# Patient Record
Sex: MALE
[De-identification: names, ages, dates, MRNs, and addresses within clinical notes are randomized per-mention and may not be internally consistent; named-entity substitution may affect disease eponyms.]

---

## 2019-04-23 PROBLEM — Z00.00 ENCOUNTER FOR PREVENTIVE HEALTH EXAMINATION: Status: ACTIVE | Noted: 2019-04-23

## 2019-04-24 ENCOUNTER — APPOINTMENT (OUTPATIENT)
Dept: OTOLARYNGOLOGY | Facility: CLINIC | Age: 64
End: 2019-04-24
Payer: MEDICARE

## 2019-04-24 DIAGNOSIS — H90.3 SENSORINEURAL HEARING LOSS, BILATERAL: ICD-10-CM

## 2019-04-24 DIAGNOSIS — B20 HUMAN IMMUNODEFICIENCY VIRUS [HIV] DISEASE: ICD-10-CM

## 2019-04-24 DIAGNOSIS — J31.0 CHRONIC RHINITIS: ICD-10-CM

## 2019-04-24 DIAGNOSIS — L91.8 OTHER HYPERTROPHIC DISORDERS OF THE SKIN: ICD-10-CM

## 2019-04-24 DIAGNOSIS — H93.299 OTHER ABNORMAL AUDITORY PERCEPTIONS, UNSPECIFIED EAR: ICD-10-CM

## 2019-04-24 DIAGNOSIS — M26.609 UNSPECIFIED TEMPOROMANDIBULAR JOINT DISORDER: ICD-10-CM

## 2019-04-24 PROCEDURE — 92550 TYMPANOMETRY & REFLEX THRESH: CPT

## 2019-04-24 PROCEDURE — 99213 OFFICE O/P EST LOW 20 MIN: CPT | Mod: 25

## 2019-04-24 PROCEDURE — 31231 NASAL ENDOSCOPY DX: CPT

## 2019-04-24 PROCEDURE — 99203 OFFICE O/P NEW LOW 30 MIN: CPT | Mod: 25

## 2019-04-24 PROCEDURE — 92557 COMPREHENSIVE HEARING TEST: CPT

## 2019-04-24 NOTE — CONSULT LETTER
[Dear  ___] : Dear  [unfilled], [Please see my note below.] : Please see my note below. [Courtesy Letter:] : I had the pleasure of seeing your patient, [unfilled], in my office today. [Sincerely,] : Sincerely, [Consult Closing:] : Thank you very much for allowing me to participate in the care of this patient.  If you have any questions, please do not hesitate to contact me. [FreeTextEntry2] : Dr. Nghia Guo\par  [FreeTextEntry3] : Kelechi \par \par \par Rodney Ulloa MD\par [facility]\par [facility]\par  [facility]\par \par

## 2019-04-24 NOTE — ASSESSMENT
CHIEF COMPLAINT: Vit D Deficiency  60 year old being seen as a f/u. On weekly ergo. No fractures. No kidney stones. Overall feeling well.       PAST MEDICAL HISTORY/PAST SURGICAL HISTORY:  Reviewed in [facility]    SOCIAL HISTORY: No T/A    FAMILY HISTORY:  No hip fractures. + osteoporosis. No DM.     MEDICATIONS/ALLERGIES: The patient's MedCard has been updated and reviewed.      ROS:   Constitutional: No recent significant weight change  Eyes: No recent visual changes  ENT: No dysphagia  Cardiovascular: Denies current anginal symptoms  Respiratory: Denies current respiratory difficulty  Gastrointestinal: Denies recent bowel disturbances  GenitoUrinary - No dysuria  Skin: No new skin rash  Neurologic: No focal neurologic complaints  Remainder ROS negative        PE:    GENERAL: Well developed, well nourished.  NECK: Supple, trachea midline, No palpable thyroid nodules.   CHEST: Resp even and unlabored, CTA bilateral.  CARDIAC: RRR, S1, S2 heard, no murmurs, rubs, S3, or S4    Results for ALLEGRA ISABEL (MRN [id number]) as of 7/18/2018 14:47   Ref. Range 7/11/2018 07:21   Sodium Latest Ref Range: 136 - 145 mmol/L 139   Potassium Latest Ref Range: 3.5 - 5.1 mmol/L 4.2   Chloride Latest Ref Range: 95 - 110 mmol/L 106   CO2 Latest Ref Range: 23 - 29 mmol/L 25   Anion Gap Latest Ref Range: 8 - 16 mmol/L 8   BUN, Bld Latest Ref Range: 6 - 20 mg/dL 14   Creatinine Latest Ref Range: 0.5 - 1.4 mg/dL 0.7   eGFR if non African American Latest Ref Range: >60 mL/min/1.73 m^2 >60.0   eGFR if African American Latest Ref Range: >60 mL/min/1.73 m^2 >60.0   Glucose Latest Ref Range: 70 - 110 mg/dL 126 (H)   Calcium Latest Ref Range: 8.7 - 10.5 mg/dL 9.5   Alkaline Phosphatase Latest Ref Range: 55 - 135 U/L 89   Total Protein Latest Ref Range: 6.0 - 8.4 g/dL 6.3   Albumin Latest Ref Range: 3.5 - 5.2 g/dL 3.6   Total Bilirubin Latest Ref Range: 0.1 - 1.0 mg/dL 0.8   AST Latest Ref Range: 10 - 40 U/L 19   ALT Latest Ref Range: 10 - 44 U/L 29  [FreeTextEntry1] : It was my impression that he has a skin tag of the left upper lid. I asked to speak to his ophthalmologist about whether he or she would excise otherwise I would do so.\par He has a rhinitis but no evidence of sinusitis or eustachian tube dysfunction.\par He had here fibers in the ear canals obstructing visualization visualization but otherwise the ear had no significant pathology other than for high-frequency sensory neural hearing losses.\par This was borderline for amplification and I suggested repeating his hearing test in a year. \par It was my impression is that the patient's symptoms were from the temporomandibular joint.\par I recommended warm compresses, a soft diet, and anti-inflammatories.\par I would recommend following up further with the patient's dentist for a possible bruxism appliance if this fails to respond.   Triglycerides Latest Ref Range: 30 - 150 mg/dL 114   Cholesterol Latest Ref Range: 120 - 199 mg/dL 188   HDL Latest Ref Range: 40 - 75 mg/dL 65   LDL Cholesterol Latest Ref Range: 63.0 - 159.0 mg/dL 100.2   Total Cholesterol/HDL Ratio Latest Ref Range: 2.0 - 5.0  2.9   Vit D, 25-Hydroxy Latest Ref Range: 30 - 96 ng/mL 24 (L)   Hemoglobin A1C Latest Ref Range: 4.0 - 5.6 % 5.8 (H)   Estimated Avg Glucose Latest Ref Range: 68 - 131 mg/dL 120           ASSESSMENT/PLAN:  1. Vit D deficiency- Will do 6 more months of ergo. Will check in 6 months. Once stable can take either 2000 OTC vit D as maintenance or once monthly ergo    2. HTN- controlled. Continue current Tx.     FOLLOWUP  6 Months Vit D

## 2019-04-24 NOTE — PHYSICAL EXAM
[de-identified] : TMJ:\par He had moderate crepitus and tenderness and was passing posterior dentition in all 4 quadrants\par \par Nasal endoscopy: \par \par Procedure Note:\par \par Nasal endoscopy was done with topical anesthesia and a fiberoptic endoscope.\par Indication: Nasal congestion, rule out sinusitis.\par Procedure: The nasal cavity was anesthetized with topical Afrin and Pontocaine. An  endoscope was used and inserted into the nasal cavity.  This showed that the nasal mucosa was slightly boggy.  There was a moderate S-shaped septal deflection.  However, the middle meati were open.  There were no polyps, purulence or masses.  The eustachian tube orifices were clear.  The nasopharynx was benign and without mass.\par \par Flexible fiberoptic laryngoscopy: [facility] [id number]\par Indications: Dysphagia\par Procedure note:\par \par Flexible fiberoptic laryngoscopy was performed because of dysphagia and because of the patient's inability to tolerate adequate mirror examination.\par \par The nasal cavity was anesthetized with Pontocaine and Afrin.\par The flexible endoscope was placed into the patient's nasal cavity.\par The nasopharynx was without masses.\par The oropharynx and base of tongue had no masses.\par The epiglottis and false vocal cords were normal.\par The pyriform sinuses were without mucosal lesions or pooling of secretions.  \par The lateral pharyngeal walls were clear and symmetrical.\par The vocal folds were clear and mobile; they abducted and adducted normally.\par There was no interarytenoid mass or fullness.\par There was mild pooling of secretions\par \par His base of tongue was soft to palpation\par \par Audiometry showed normal hearing 3 2000 Hz with symmetric loss as above that and normal tympanograms [FreeTextEntry1] : General:\par The patient was alert and oriented and in no distress.\par Voice was clear.\par \par Face:\par The patient had no facial asymmetry or mass.\par The skin was unremarkable.\par \par Eyes:\par The pupils were equal round and reactive to light and accommodation.\par There was no significant nice diagnosis or discount to get gaze noted.\par \par Nose: \par The external nose had no significant deformity.  There was no facial tenderness.  On anterior rhinoscopy, the nasal mucosa was clear.  The anterior septum was midline.  There were no visualized polyps purulence  or masses.\par \par Oral cavity:\par The oral mucosa was normal.\par The oral and base of tongue were clear and without mass.\par The gingival and buccal mucosa were moist and without lesions.\par The palate moved well.\par There was no cleft to the palate.\par There appeared to be good salivary flow.  \par There was no pus, erythema or mass in the oral cavity.\par \par \par Ears:\par The external ears were normal without deformity.\par The ear canals were blocked with hair but not obstructed.\par The tympanic membranes were intact and normal.\par \par Neck: \par The neck was symmetrical.\par The parotid and submandibular glands were normal without masses.\par The trachea was midline and there was no unusual crepitus.\par The thyroid was smooth and nontender and no masses were palpated.\par There was no significant cervical adenopathy.\par \par Eyes:\par The pupils were equal round and reactive to light and accommodation.\par There was no significant nystagmus or disconjugate gaze noted.\par He had a skin tag of the left upper lid\par Neuro:\par Neurologically, the patient was awake, alert, and oriented to person, place and time. There were no obvious focal neurologic abnormalities.  Cranial nerves II through XII were grossly intact.\par \par \par TMJ:\par The temporomandibular joints were mildly tender without crepitus and he was missing multiple dentition posteriorly \par  in all 4 quadrants.

## 2019-04-24 NOTE — HISTORY OF PRESENT ILLNESS
[de-identified] : STEVAN JOHNSON is a 63 year old male who comes in complaining of Right-sided otalgia. It's worse when he moves his jaw. He has a pressure sensation. He has lesser left-sided symptoms.The patient had no other ear nose or throat complaints at this visit.